# Patient Record
Sex: MALE | Race: WHITE | Employment: PART TIME | URBAN - METROPOLITAN AREA
[De-identification: names, ages, dates, MRNs, and addresses within clinical notes are randomized per-mention and may not be internally consistent; named-entity substitution may affect disease eponyms.]

---

## 2018-08-12 ENCOUNTER — HOSPITAL ENCOUNTER (EMERGENCY)
Facility: CLINIC | Age: 48
Discharge: STILL ADMITTED OR ADMINISTRATIVE | End: 2018-08-12
Payer: COMMERCIAL

## 2018-08-12 ENCOUNTER — HOSPITAL ENCOUNTER (EMERGENCY)
Facility: CLINIC | Age: 48
Discharge: HOME | End: 2018-08-12
Attending: EMERGENCY MEDICINE | Admitting: EMERGENCY MEDICINE
Payer: COMMERCIAL

## 2018-08-12 VITALS
HEIGHT: 70 IN | OXYGEN SATURATION: 96 % | HEART RATE: 123 BPM | RESPIRATION RATE: 18 BRPM | SYSTOLIC BLOOD PRESSURE: 127 MMHG | TEMPERATURE: 98 F | DIASTOLIC BLOOD PRESSURE: 82 MMHG

## 2018-08-12 DIAGNOSIS — R11.2 NAUSEA AND VOMITING, INTRACTABILITY OF VOMITING NOT SPECIFIED, UNSPECIFIED VOMITING TYPE: ICD-10-CM

## 2018-08-12 DIAGNOSIS — R74.8 ELEVATED LIVER ENZYMES: ICD-10-CM

## 2018-08-12 DIAGNOSIS — F10.21 HISTORY OF ALCOHOLISM (H): ICD-10-CM

## 2018-08-12 DIAGNOSIS — N17.9 ACUTE RENAL FAILURE, UNSPECIFIED ACUTE RENAL FAILURE TYPE (H): ICD-10-CM

## 2018-08-12 LAB
ALBUMIN SERPL-MCNC: 3.9 G/DL (ref 3.4–5)
ALP SERPL-CCNC: 87 U/L (ref 40–150)
ALT SERPL W P-5'-P-CCNC: 364 U/L (ref 0–70)
ANION GAP SERPL CALCULATED.3IONS-SCNC: 11 MMOL/L (ref 3–14)
AST SERPL W P-5'-P-CCNC: 291 U/L (ref 0–45)
BASOPHILS # BLD AUTO: 0 10E9/L (ref 0–0.2)
BASOPHILS NFR BLD AUTO: 0.2 %
BILIRUB DIRECT SERPL-MCNC: 0.3 MG/DL (ref 0–0.2)
BILIRUB SERPL-MCNC: 1 MG/DL (ref 0.2–1.3)
BUN SERPL-MCNC: 34 MG/DL (ref 7–30)
CALCIUM SERPL-MCNC: 9.6 MG/DL (ref 8.5–10.1)
CHLORIDE SERPL-SCNC: 91 MMOL/L (ref 94–109)
CO2 SERPL-SCNC: 29 MMOL/L (ref 20–32)
CREAT SERPL-MCNC: 1.52 MG/DL (ref 0.66–1.25)
DIFFERENTIAL METHOD BLD: ABNORMAL
EOSINOPHIL # BLD AUTO: 0 10E9/L (ref 0–0.7)
EOSINOPHIL NFR BLD AUTO: 0.1 %
ERYTHROCYTE [DISTWIDTH] IN BLOOD BY AUTOMATED COUNT: 12.5 % (ref 10–15)
GFR SERPL CREATININE-BSD FRML MDRD: 49 ML/MIN/1.7M2
GLUCOSE SERPL-MCNC: 108 MG/DL (ref 70–99)
HCT VFR BLD AUTO: 47.6 % (ref 40–53)
HGB BLD-MCNC: 16.7 G/DL (ref 13.3–17.7)
IMM GRANULOCYTES # BLD: 0.1 10E9/L (ref 0–0.4)
IMM GRANULOCYTES NFR BLD: 1.3 %
LIPASE SERPL-CCNC: 232 U/L (ref 73–393)
LYMPHOCYTES # BLD AUTO: 1.3 10E9/L (ref 0.8–5.3)
LYMPHOCYTES NFR BLD AUTO: 12.7 %
MAGNESIUM SERPL-MCNC: 1.7 MG/DL (ref 1.6–2.3)
MCH RBC QN AUTO: 34.2 PG (ref 26.5–33)
MCHC RBC AUTO-ENTMCNC: 35.1 G/DL (ref 31.5–36.5)
MCV RBC AUTO: 98 FL (ref 78–100)
MONOCYTES # BLD AUTO: 0.8 10E9/L (ref 0–1.3)
MONOCYTES NFR BLD AUTO: 7.7 %
NEUTROPHILS # BLD AUTO: 7.9 10E9/L (ref 1.6–8.3)
NEUTROPHILS NFR BLD AUTO: 78 %
NRBC # BLD AUTO: 0 10*3/UL
NRBC BLD AUTO-RTO: 0 /100
PLATELET # BLD AUTO: 244 10E9/L (ref 150–450)
POTASSIUM SERPL-SCNC: 3.5 MMOL/L (ref 3.4–5.3)
PROT SERPL-MCNC: 8 G/DL (ref 6.8–8.8)
RBC # BLD AUTO: 4.88 10E12/L (ref 4.4–5.9)
SODIUM SERPL-SCNC: 131 MMOL/L (ref 133–144)
WBC # BLD AUTO: 10.1 10E9/L (ref 4–11)

## 2018-08-12 PROCEDURE — 85025 COMPLETE CBC W/AUTO DIFF WBC: CPT | Performed by: EMERGENCY MEDICINE

## 2018-08-12 PROCEDURE — 80076 HEPATIC FUNCTION PANEL: CPT | Performed by: EMERGENCY MEDICINE

## 2018-08-12 PROCEDURE — 83690 ASSAY OF LIPASE: CPT | Performed by: EMERGENCY MEDICINE

## 2018-08-12 PROCEDURE — 83735 ASSAY OF MAGNESIUM: CPT | Performed by: EMERGENCY MEDICINE

## 2018-08-12 PROCEDURE — 25000128 H RX IP 250 OP 636: Performed by: EMERGENCY MEDICINE

## 2018-08-12 PROCEDURE — 99284 EMERGENCY DEPT VISIT MOD MDM: CPT | Mod: Z6 | Performed by: EMERGENCY MEDICINE

## 2018-08-12 PROCEDURE — 96361 HYDRATE IV INFUSION ADD-ON: CPT

## 2018-08-12 PROCEDURE — 99285 EMERGENCY DEPT VISIT HI MDM: CPT | Mod: 25

## 2018-08-12 PROCEDURE — 25000125 ZZHC RX 250: Performed by: EMERGENCY MEDICINE

## 2018-08-12 PROCEDURE — 80048 BASIC METABOLIC PNL TOTAL CA: CPT | Performed by: EMERGENCY MEDICINE

## 2018-08-12 PROCEDURE — 96376 TX/PRO/DX INJ SAME DRUG ADON: CPT

## 2018-08-12 PROCEDURE — 96365 THER/PROPH/DIAG IV INF INIT: CPT

## 2018-08-12 PROCEDURE — 96366 THER/PROPH/DIAG IV INF ADDON: CPT

## 2018-08-12 PROCEDURE — 96375 TX/PRO/DX INJ NEW DRUG ADDON: CPT

## 2018-08-12 RX ORDER — PROMETHAZINE HYDROCHLORIDE 25 MG/1
12.5 TABLET ORAL EVERY 8 HOURS PRN
Qty: 7 TABLET | Refills: 1 | Status: SHIPPED | OUTPATIENT
Start: 2018-08-12

## 2018-08-12 RX ORDER — ONDANSETRON 4 MG/1
4 TABLET, ORALLY DISINTEGRATING ORAL EVERY 8 HOURS PRN
Qty: 7 TABLET | Refills: 0 | Status: SHIPPED | OUTPATIENT
Start: 2018-08-12

## 2018-08-12 RX ORDER — SODIUM CHLORIDE 9 MG/ML
1000 INJECTION, SOLUTION INTRAVENOUS CONTINUOUS
Status: DISCONTINUED | OUTPATIENT
Start: 2018-08-12 | End: 2018-08-12 | Stop reason: HOSPADM

## 2018-08-12 RX ORDER — ONDANSETRON 2 MG/ML
4 INJECTION INTRAMUSCULAR; INTRAVENOUS EVERY 30 MIN PRN
Status: DISCONTINUED | OUTPATIENT
Start: 2018-08-12 | End: 2018-08-12 | Stop reason: HOSPADM

## 2018-08-12 RX ORDER — BACLOFEN 20 MG/1
20 TABLET ORAL 3 TIMES DAILY
COMMUNITY
Start: 2018-08-06

## 2018-08-12 RX ORDER — LISINOPRIL AND HYDROCHLOROTHIAZIDE 20; 25 MG/1; MG/1
1 TABLET ORAL DAILY
COMMUNITY
Start: 2017-04-11

## 2018-08-12 RX ORDER — PROMETHAZINE HYDROCHLORIDE 25 MG/ML
6.25 INJECTION, SOLUTION INTRAMUSCULAR; INTRAVENOUS
Status: COMPLETED | OUTPATIENT
Start: 2018-08-12 | End: 2018-08-12

## 2018-08-12 RX ADMIN — ONDANSETRON 4 MG: 2 INJECTION INTRAMUSCULAR; INTRAVENOUS at 15:39

## 2018-08-12 RX ADMIN — PROMETHAZINE HYDROCHLORIDE 6.25 MG: 25 INJECTION INTRAMUSCULAR; INTRAVENOUS at 18:23

## 2018-08-12 RX ADMIN — ONDANSETRON 4 MG: 2 INJECTION INTRAMUSCULAR; INTRAVENOUS at 17:21

## 2018-08-12 RX ADMIN — Medication 2 G: at 17:24

## 2018-08-12 RX ADMIN — SODIUM CHLORIDE 1000 ML: 9 INJECTION, SOLUTION INTRAVENOUS at 18:27

## 2018-08-12 RX ADMIN — FOLIC ACID: 5 INJECTION, SOLUTION INTRAMUSCULAR; INTRAVENOUS; SUBCUTANEOUS at 15:55

## 2018-08-12 RX ADMIN — SODIUM CHLORIDE 1000 ML: 9 INJECTION, SOLUTION INTRAVENOUS at 15:36

## 2018-08-12 ASSESSMENT — ENCOUNTER SYMPTOMS
ENDOCRINE NEGATIVE: 1
CONSTITUTIONAL NEGATIVE: 1
VOMITING: 1
EYES NEGATIVE: 1
RESPIRATORY NEGATIVE: 1
CARDIOVASCULAR NEGATIVE: 1
ALLERGIC/IMMUNOLOGIC NEGATIVE: 1
NEUROLOGICAL NEGATIVE: 1
NAUSEA: 1
HEMATOLOGIC/LYMPHATIC NEGATIVE: 1
MUSCULOSKELETAL NEGATIVE: 1
PSYCHIATRIC NEGATIVE: 1

## 2018-08-12 NOTE — ED NOTES
Pt admitted to [facility] last Wednesday for alcoholism.   Pt reports unable to keep anything down since blowing 0.   Pt has received zofran from [facility] with no relief.   Provider in room with pt.

## 2018-08-12 NOTE — ED PROVIDER NOTES
"  History     Chief Complaint   Patient presents with     Nausea & Vomiting     from [place] with n/v.     HPI  Damien Edmondson is a 48 year old male with a history of acute kidney failure, hypertension, alcoholism, obesity and factor V Leiden who presents to the emergency department for evaluation of nausea and vomiting. Patient is currently at [facility] for alcohol detox. His drink of choise is vodka. Patient arrived at [facility] on 08/08/18 and his last drink was earlier that morning. Patient went through the detox program and got down to 0.00 alcohol level. Since then he has not been able to eat or drink without vomiting. Patient complains of nausea and abdominal pain from \"heaving\". Patient denies any diarrhea or bloody stools. Patient is from [place], [place]. 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 Patient is currently taking lisinopril, xarelto, omeprazole and baclofen. He takes baclofen as an \"anti-crave\" medication for his alcoholism. Patient was diagnosed with three blood clots in his left leg over a year ago which is why he is taking xarelto. This is the patients second time going through detox.     Social History: The patient is currently at [facility], but is from [place], [place]. He presents to the ED via [facility] transportation by himself.     Problem List:    There are no active problems to display for this patient.       Past Medical History:    No past medical history on file.    Past Surgical History:    No past surgical history on file.    Family History:    No family history on file.    Social History:  Marital Status:    Social History   Substance Use Topics     Smoking status: Not on file     Smokeless tobacco: Not on file     Alcohol use Not on file        Medications:      baclofen (LIORESAL) 20 MG tablet   lisinopril-hydrochlorothiazide (PRINZIDE/ZESTORETIC) 20-25 MG per tablet   omeprazole (PRILOSEC) 20 MG CR capsule   ondansetron (ZOFRAN ODT) 4 MG ODT tab " "  promethazine (PHENERGAN) 25 MG tablet   rivaroxaban ANTICOAGULANT (XARELTO) 20 MG TABS tablet         Review of Systems   Constitutional: Negative.    HENT: Negative.    Eyes: Negative.    Respiratory: Negative.    Cardiovascular: Negative.    Gastrointestinal: Positive for nausea and vomiting.   Endocrine: Negative.    Genitourinary: Negative.    Musculoskeletal: Negative.    Skin: Negative.    Allergic/Immunologic: Negative.    Neurological: Negative.    Hematological: Negative.    Psychiatric/Behavioral: Negative.    All other systems reviewed and are negative.      Physical Exam   BP: 134/89  Pulse: 123  Heart Rate: 109  Temp: 98.6  F (37  C)  Resp: 18  Height: 177.8 cm (5' 10\")  SpO2: 97 %      Physical Exam   Constitutional: He is oriented to person, place, and time. He appears well-developed and well-nourished. No distress.   HENT:   Head: Normocephalic and atraumatic.   Eyes: Conjunctivae and EOM are normal. Pupils are equal, round, and reactive to light. Right eye exhibits no discharge. Left eye exhibits no discharge. No scleral icterus.   Neck: Normal range of motion. Neck supple. No JVD present. No tracheal deviation present. No thyromegaly present.   Cardiovascular: Normal rate and regular rhythm.  Exam reveals no gallop and no friction rub.    No murmur heard.  Pulmonary/Chest: No stridor.   Lymphadenopathy:     He has no cervical adenopathy.   Neurological: He is alert and oriented to person, place, and time.   Skin: No rash noted. He is not diaphoretic. No erythema. No pallor.   Psychiatric: He has a normal mood and affect. His behavior is normal. Judgment and thought content normal.       ED Course   3:32 PM Patient Assessed.     ED Course     Procedures               Critical Care time:  none               ED Medications:  Medications   0.9% sodium chloride BOLUS (1,000 mLs Intravenous Restarted 8/12/18 1720)     Followed by   sodium chloride 0.9% infusion (1,000 mLs Intravenous New Bag 8/12/18 " 1827)   ondansetron (ZOFRAN) injection 4 mg (4 mg Intravenous Given 8/12/18 1721)   sodium chloride 0.9 % 1,000 mL with INFUVITE ADULT 10 mL, thiamine 100 mg, folic acid 1 mg infusion ( Intravenous Stopped 8/12/18 1720)   magnesium sulfate 2 g in NS intermittent infusion (PharMEDium or FV Cmpd) (0 g Intravenous Stopped 8/12/18 1825)   promethazine (PHENERGAN) IV injection 6.25 mg (6.25 mg Intravenous Given 8/12/18 1823)       ED Vitals:  Vitals:    08/12/18 1815 08/12/18 1830 08/12/18 1900 08/12/18 1930   BP:  141/90 134/82 127/82   Pulse:       Resp: 27 23 20 28   Temp:       TempSrc:       SpO2: 94% 96% 96%    Height:           ED Labs and Imaging:  Results for orders placed or performed during the hospital encounter of 08/12/18 (from the past 24 hour(s))   Basic metabolic panel   Result Value Ref Range    Sodium 131 (L) 133 - 144 mmol/L    Potassium 3.5 3.4 - 5.3 mmol/L    Chloride 91 (L) 94 - 109 mmol/L    Carbon Dioxide 29 20 - 32 mmol/L    Anion Gap 11 3 - 14 mmol/L    Glucose 108 (H) 70 - 99 mg/dL    Urea Nitrogen 34 (H) 7 - 30 mg/dL    Creatinine 1.52 (H) 0.66 - 1.25 mg/dL    GFR Estimate 49 (L) >60 mL/min/1.7m2    GFR Estimate If Black 60 (L) >60 mL/min/1.7m2    Calcium 9.6 8.5 - 10.1 mg/dL   CBC with platelets differential   Result Value Ref Range    WBC 10.1 4.0 - 11.0 10e9/L    RBC Count 4.88 4.4 - 5.9 10e12/L    Hemoglobin 16.7 13.3 - 17.7 g/dL    Hematocrit 47.6 40.0 - 53.0 %    MCV 98 78 - 100 fl    MCH 34.2 (H) 26.5 - 33.0 pg    MCHC 35.1 31.5 - 36.5 g/dL    RDW 12.5 10.0 - 15.0 %    Platelet Count 244 150 - 450 10e9/L    Diff Method Automated Method     % Neutrophils 78.0 %    % Lymphocytes 12.7 %    % Monocytes 7.7 %    % Eosinophils 0.1 %    % Basophils 0.2 %    % Immature Granulocytes 1.3 %    Nucleated RBCs 0 0 /100    Absolute Neutrophil 7.9 1.6 - 8.3 10e9/L    Absolute Lymphocytes 1.3 0.8 - 5.3 10e9/L    Absolute Monocytes 0.8 0.0 - 1.3 10e9/L    Absolute Eosinophils 0.0 0.0 - 0.7 10e9/L     "Absolute Basophils 0.0 0.0 - 0.2 10e9/L    Abs Immature Granulocytes 0.1 0 - 0.4 10e9/L    Absolute Nucleated RBC 0.0    Lipase   Result Value Ref Range    Lipase 232 73 - 393 U/L   Magnesium   Result Value Ref Range    Magnesium 1.7 1.6 - 2.3 mg/dL   Hepatic panel   Result Value Ref Range    Bilirubin Direct 0.3 (H) 0.0 - 0.2 mg/dL    Bilirubin Total 1.0 0.2 - 1.3 mg/dL    Albumin 3.9 3.4 - 5.0 g/dL    Protein Total 8.0 6.8 - 8.8 g/dL    Alkaline Phosphatase 87 40 - 150 U/L     (H) 0 - 70 U/L     (H) 0 - 45 U/L         Assessments & Plan (with Medical Decision Making)   Clinical Impression: 48-year-old male with a history of hypertension, generalized anxiety disorder, history of alcohol abuse with prior history of Wernicke's Korsakoff syndrome, acute idiopathic gout who presented for evaluation for nausea and vomiting and concern for dehydration versus alcoholic ketoacidosis.  Patient is currently at [facility].  He arrived at [facility] 4 days prior.  He is in [facility] for treatment for alcoholism.  He reports his drink of choice is vodka.  Last drink was on August 8, 2018.  This is a second time in treatment by his report.  He is originally from [facility].  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  He reports he is on lisinopril for hypertension takes baclofen for chronic pain and to help with \"cravings\".  He also takes omeprazole and has been on Xarelto since 2017 after he was diagnosed with a left lower leg DVT.  He reports a history of factor V Leiden.  On my exam patient has stigmata of alcoholism with generalized facial flushing.  He is tachycardic at rest his blood pressure is normal he is afebrile 97% on room air.      ED Course and  Plan:   I reviewed his medical records.  Patient was last seen in the emergency department in January 2018 for episode of nausea and vomiting related to recent diagnosis of pneumonia treated with doxycycline.  He was given IV fluids and " antibiotic therapy.  Also given a banana bag.  Reviewed patient's medical records from [facility].  Records indicate the patient had elevated liver enzymes with an AST of 227, ALT of 260.  GGT is 691.  Total bilirubin was 1.6.  Creatinine was 1.35 normal potassium magnesium was 1.6    His arrival creatinine today is 1.5, increased from intake labs reported from [place].  Normal lipase today.  Patient does have a known history of kidney injury/disease.  His creatinine in January 2018 was 1.1.  Patient was reevaluated after IV fluids, IV banana bag, 2 doses of IV Zofran and IV Phenergan.  He reported feeling some improvement in his symptoms.  Did not report any abdominal pain hence there was no indication for abdominal imaging including CT or ultrasound.  I did stress to the patient that it would be helpful to have a repeat creatinine after IV fluids in the next 2-3 days.  Patient is discharged home with Zofran ODT for as needed use to help with nausea and vomiting.  Patient did not report any diarrhea and did not have any fever.  He had some resting tachycardia but no tremor prior to discharge    With his elevated liver enzymes are considered portal vein thrombosis with underlying history of alcoholism.  There is no known history of liver disease, or portal hypertension.  Patient did not complain of any abdominal pain was hemodynamically stable.  I did recommend on discharge paperwork that he is loading consider an outpatient ultrasound if he continues to have rising liver enzymes.  I suggested a recheck of his creatinine within 72 hours to ensure that it was normalizing or at least improving towards baseline after receiving IV fluids and treatment in the emergency department.      Disclaimer: This note consists of symbols derived from keyboarding, dictation and/or voice recognition software. As a result, there may be errors in the script that have gone undetected. Please consider this when interpreting information  found in this chart.      I have reviewed the nursing notes.    I have reviewed the findings, diagnosis, plan and need for follow up with the patient.       New Prescriptions    ONDANSETRON (ZOFRAN ODT) 4 MG ODT TAB    Take 1 tablet (4 mg) by mouth every 8 hours as needed    PROMETHAZINE (PHENERGAN) 25 MG TABLET    Take 0.5 tablets (12.5 mg) by mouth every 8 hours as needed for nausea       Final diagnoses:   Acute renal failure, unspecified acute renal failure type (H) - Last creatinine on file is from January 2018 when it was 1.1.  Was 1.35 at [facility]   History of alcoholism (H)   Nausea and vomiting, intractability of vomiting not specified, unspecified vomiting type   Elevated liver enzymes - ALT is 364, AST is 291.  While you are at [facility] your ALT was 260, AST was 227   This document serves as a record of the services and decisions personally performed and made by Man Paulson, *. It was created on HIS/HER behalf by Heidi Shepherd, a trained medical scribe. The creation of this document is based on the provider's statements to the medical scribe.  Heidi Shepherd 3:27 PM 8/12/2018    Provider:  The information in this document, created by the medical scribe for me, accurately reflects the services I personally performed and the decisions made by me. I have reviewed and approved this document for accuracy prior to leaving the patient care area.  Man Paulson, * 3:27 PM 8/12/2018 8/12/2018   [facility] EMERGENCY DEPARTMENT     Man Paulson MD  08/12/18 3752

## 2018-08-12 NOTE — ED AVS SNAPSHOT
[facility] Emergency Department    [address] [address]    Phone:  [phone]    Fax:  [phone]                                       Damien Edmondson   MRN: [id number]    Department:  [facility] Emergency Department   Date of Visit:  8/12/2018           After Visit Summary Signature Page     I have received my discharge instructions, and my questions have been answered. I have discussed any challenges I see with this plan with the nurse or doctor.    ..........................................................................................................................................  Patient/Patient Representative Signature      ..........................................................................................................................................  Patient Representative Print Name and Relationship to Patient    ..................................................               ................................................  Date                                            Time    ..........................................................................................................................................  Reviewed by Signature/Title    ...................................................              ..............................................  Date                                                            Time

## 2018-08-12 NOTE — ED AVS SNAPSHOT
[facility] Emergency Department    [address] [place] [address]    Phone:  [phone]    Fax:  [phone]                                       Damien Edmondson   MRN: [id number]    Department:  [facility] Emergency Department   Date of Visit:  8/12/2018           Patient Information     Date Of Birth          1970        Your diagnoses for this visit were:     Acute renal failure, unspecified acute renal failure type (H) Last creatinine on file is from January 2018 when it was 1.1.  Was 1.35 at [facility]    History of alcoholism (H)     Nausea and vomiting, intractability of vomiting not specified, unspecified vomiting type     Elevated liver enzymes ALT is 364, AST is 291.  While you are at [facility] your ALT was 260, AST was 227       You were seen by Man Paulson MD.      Follow-up Information     Follow up with [facility] Emergency Department.    Specialty:  EMERGENCY MEDICINE    Why:  I recommend a repeat recheck of your creatinine. it is elevated and concerning for kidney injury. because you are vomiting, and have had decreased oral intake your should have your creatinine rechecked within the next 3 days.    Contact information:    [address] [place] [address] [phone]    Additional information:    The medical center is located at   [address] [place] [address] (between [facility] and   [address], four miles north   of [place]).        Follow up with [facility] Emergency Department.    Specialty:  EMERGENCY MEDICINE    Why:  Take Zofran ODT prescribed as needed for nausea (1-2 tabs every 4-6 hours). You may try phenergran (6.25mg to 12.5mg) every 6-8 hours if no relief or improvement with zofran     Contact information:    [address] [place] [address] [phone]    Additional information:    The medical center is located at   [address] [place] [address] (between [facility] and   [address], four miles north   of [place]  [place]).        Follow up with [facility] Emergency Department.    Specialty:  EMERGENCY MEDICINE    Why:  Your elevated liver enzymes may be related to no alcohol dependence.  A follow-up recheck including consideration of an ultrasound if you develop abdominal pain may be helpful    Contact information:    [address] [facility] [address] [phone]    Additional information:    The medical center is located at   [address]. (between [address]   [address] in [place], four miles north   of [place]).      24 Hour Appointment Hotline       To make an appointment at any [facility] clinic, call [phone] ([phone]). If you don't have a family doctor or clinic, we will help you find one. [facility] clinics are conveniently located to serve the needs of you and your family.             Review of your medicines      START taking        Dose / Directions Last dose taken    ondansetron 4 MG ODT tab   Commonly known as:  ZOFRAN ODT   Dose:  4 mg   Quantity:  7 tablet        Take 1 tablet (4 mg) by mouth every 8 hours as needed   Refills:  0        promethazine 25 MG tablet   Commonly known as:  PHENERGAN   Dose:  12.5 mg   Quantity:  7 tablet        Take 0.5 tablets (12.5 mg) by mouth every 8 hours as needed for nausea   Refills:  1          Our records show that you are taking the medicines listed below. If these are incorrect, please call your family doctor or clinic.        Dose / Directions Last dose taken    baclofen 20 MG tablet   Commonly known as:  LIORESAL   Dose:  20 mg        Take 20 mg by mouth 3 times daily   Refills:  0        lisinopril-hydrochlorothiazide 20-25 MG per tablet   Commonly known as:  PRINZIDE/ZESTORETIC   Dose:  1 tablet        Take 1 tablet by mouth daily   Refills:  0        omeprazole 20 MG CR capsule   Commonly known as:  priLOSEC   Dose:  20 mg        Take 20 mg by mouth daily   Refills:  0        rivaroxaban ANTICOAGULANT 20 MG Tabs tablet   Commonly  known as:  XARELTO   Dose:  20 mg        Take 20 mg by mouth daily   Refills:  0                Prescriptions were sent or printed at these locations (2 Prescriptions)                   [facility] [place], [place] - [address]   [address] [facility] [address] [address]    Telephone:  [phone]   Fax:  [phone]   Hours:                  Printed at Department/Unit printer (2 of 2)         ondansetron (ZOFRAN ODT) 4 MG ODT tab               promethazine (PHENERGAN) 25 MG tablet                Procedures and tests performed during your visit     Basic metabolic panel    CBC with platelets differential    Hepatic panel    Lipase    Magnesium      Orders Needing Specimen Collection     None      Pending Results     No orders found from 8/10/2018 to 8/13/2018.            Pending Culture Results     No orders found from 8/10/2018 to 8/13/2018.            Pending Results Instructions     If you had any lab results that were not finalized at the time of your Discharge, you can call the ED Lab Result RN at [phone]. You will be contacted by this team for any positive Lab results or changes in treatment. The nurses are available 7 days a week from 10A to 6:30P.  You can leave a message 24 hours per day and they will return your call.        Test Results From Your Hospital Stay        8/12/2018  4:01 PM      Component Results     Component Value Ref Range & Units Status    Sodium 131 (L) 133 - 144 mmol/L Final    Potassium 3.5 3.4 - 5.3 mmol/L Final    Chloride 91 (L) 94 - 109 mmol/L Final    Carbon Dioxide 29 20 - 32 mmol/L Final    Anion Gap 11 3 - 14 mmol/L Final    Glucose 108 (H) 70 - 99 mg/dL Final    Urea Nitrogen 34 (H) 7 - 30 mg/dL Final    Creatinine 1.52 (H) 0.66 - 1.25 mg/dL Final    GFR Estimate 49 (L) >60 mL/min/1.7m2 Final    Non  GFR Calc    GFR Estimate If Black 60 (L) >60 mL/min/1.7m2 Final    African American GFR Calc    Calcium 9.6 8.5 - 10.1 mg/dL Final          8/12/2018  4:01 PM      Component Results     Component Value Ref Range & Units Status    WBC 10.1 4.0 - 11.0 10e9/L Final    RBC Count 4.88 4.4 - 5.9 10e12/L Final    Hemoglobin 16.7 13.3 - 17.7 g/dL Final    Hematocrit 47.6 40.0 - 53.0 % Final    MCV 98 78 - 100 fl Final    MCH 34.2 (H) 26.5 - 33.0 pg Final    MCHC 35.1 31.5 - 36.5 g/dL Final    RDW 12.5 10.0 - 15.0 % Final    Platelet Count 244 150 - 450 10e9/L Final    Diff Method Automated Method  Final    % Neutrophils 78.0 % Final    % Lymphocytes 12.7 % Final    % Monocytes 7.7 % Final    % Eosinophils 0.1 % Final    % Basophils 0.2 % Final    % Immature Granulocytes 1.3 % Final    Nucleated RBCs 0 0 /100 Final    Absolute Neutrophil 7.9 1.6 - 8.3 10e9/L Final    Absolute Lymphocytes 1.3 0.8 - 5.3 10e9/L Final    Absolute Monocytes 0.8 0.0 - 1.3 10e9/L Final    Absolute Eosinophils 0.0 0.0 - 0.7 10e9/L Final    Absolute Basophils 0.0 0.0 - 0.2 10e9/L Final    Abs Immature Granulocytes 0.1 0 - 0.4 10e9/L Final    Absolute Nucleated RBC 0.0  Final         8/12/2018  4:01 PM      Component Results     Component Value Ref Range & Units Status    Lipase 232 73 - 393 U/L Final         8/12/2018  5:06 PM      Component Results     Component Value Ref Range & Units Status    Magnesium 1.7 1.6 - 2.3 mg/dL Final         8/12/2018  7:56 PM      Component Results     Component Value Ref Range & Units Status    Bilirubin Direct 0.3 (H) 0.0 - 0.2 mg/dL Final    Bilirubin Total 1.0 0.2 - 1.3 mg/dL Final    Albumin 3.9 3.4 - 5.0 g/dL Final    Protein Total 8.0 6.8 - 8.8 g/dL Final    Alkaline Phosphatase 87 40 - 150 U/L Final     (H) 0 - 70 U/L Final     (H) 0 - 45 U/L Final                Thank you for choosing [facility]       Thank you for choosing [facility] for your care. Our goal is always to provide you with excellent care. Hearing back from our patients is one way we can continue to improve our services. Please take a few minutes to complete the written  "survey that you may receive in the mail after you visit with us. Thank you!        [employer] Information     [employer] lets you send messages to your doctor, view your test results, renew your prescriptions, schedule appointments and more. To sign up, go to [email or web address] . Click on \"Log in\" on the left side of the screen, which will take you to the Welcome page. Then click on \"Sign up Now\" on the right side of the page.     You will be asked to enter the access code listed below, as well as some personal information. Please follow the directions to create your username and password.     Your access code is: [email or web address]  Expires: 11/10/2018  8:16 PM     Your access code will  in 90 days. If you need help or a new code, please call your [facility] clinic or [phone].        Care EveryWhere ID     This is your Care EveryWhere ID. This could be used by other organizations to access your [facility] medical records  [id number]        Equal Access to Services     TAMI [facility] : Hadcami scotto Soflores, waaxda luqadaha, qaybta kaalmada angel, july pelayo . So [facility] [phone].    ATENCIÓN: Si habla español, tiene a mao disposición servicios gratuitos de asistencia lingüística. Llame al [phone].    We comply with applicable federal civil rights laws and [place] laws. We do not discriminate on the basis of race, color, national origin, age, disability, sex, sexual orientation, or gender identity.            After Visit Summary       This is your record. Keep this with you and show to your community pharmacist(s) and doctor(s) at your next visit.                  "

## 2018-08-13 NOTE — ED NOTES
Report given to Sedrick PETER from [facility].   Provider will call our provider for acceptance back to facility.

## 2018-08-26 ENCOUNTER — HOSPITAL ENCOUNTER (EMERGENCY)
Facility: CLINIC | Age: 48
Discharge: HOME | End: 2018-08-26
Attending: PHYSICIAN ASSISTANT | Admitting: PHYSICIAN ASSISTANT
Payer: COMMERCIAL

## 2018-08-26 VITALS
SYSTOLIC BLOOD PRESSURE: 123 MMHG | OXYGEN SATURATION: 97 % | TEMPERATURE: 99.2 F | DIASTOLIC BLOOD PRESSURE: 80 MMHG | HEART RATE: 95 BPM | HEIGHT: 70 IN | RESPIRATION RATE: 16 BRPM

## 2018-08-26 DIAGNOSIS — M10.071 ACUTE IDIOPATHIC GOUT OF RIGHT FOOT: ICD-10-CM

## 2018-08-26 PROCEDURE — G0463 HOSPITAL OUTPT CLINIC VISIT: HCPCS

## 2018-08-26 PROCEDURE — 99213 OFFICE O/P EST LOW 20 MIN: CPT | Performed by: PHYSICIAN ASSISTANT

## 2018-08-26 RX ORDER — PREDNISONE 10 MG/1
TABLET ORAL
Qty: 37 TABLET | Refills: 0 | Status: SHIPPED | OUTPATIENT
Start: 2018-08-26 | End: 2018-09-05

## 2018-08-26 ASSESSMENT — ENCOUNTER SYMPTOMS
WEAKNESS: 0
NUMBNESS: 0
ARTHRALGIAS: 1
FEVER: 0
WOUND: 0

## 2018-08-26 NOTE — DISCHARGE INSTRUCTIONS
Ice, elevate, rest.     Patient to start prednisone course prescribed tomorrow.   Patient given topical diclofenac gel to use for pain.   Patient given orthopedic information for follow up and further treatment.     Patient to return if increased redness, swelling, warmth, red streaking up leg, fevers, decreased range of motion occur.   Patient offered crutches and repeat labs (CBC, CMP) today in office but patient declined at this time.

## 2018-08-26 NOTE — ED AVS SNAPSHOT
[facility] Emergency Department    [address] [address]    Phone:  [phone]    Fax:  [phone]                                       Damien Edmondson   MRN: [id number]    Department:  [facility] Emergency Department   Date of Visit:  8/26/2018           Patient Information     Date Of Birth          1970        Your diagnoses for this visit were:     Acute idiopathic gout of right foot        You were seen by Shima Green PA-C.      Follow-up Information     Follow up with System, Provider Not In.    Specialty:  Clinic    Contact information:               Follow up with [facility] Emergency Department.    Specialty:  EMERGENCY MEDICINE    Why:  As needed, If symptoms worsen    Contact information:    [facility] [facility] [address] [phone]    Additional information:    The medical center is located at   [address] (between [address] and   [address] in [place], [place] miles north   of [place]).        Discharge Instructions       Ice, elevate, rest.     Patient to start prednisone course prescribed tomorrow.   Patient given topical diclofenac gel to use for pain.   Patient given orthopedic information for follow up and further treatment.     Patient to return if increased redness, swelling, warmth, red streaking up leg, fevers, decreased range of motion occur.   Patient offered crutches and repeat labs (CBC, CMP) today in office but patient declined at this time.         24 Hour Appointment Hotline       To make an appointment at any [place] clinic, call [phone] ([phone]). If you don't have a family doctor or clinic, we will help you find one. [place] clinics are conveniently located to serve the needs of you and your family.          ED Discharge Orders     ORTHO  REFERRAL       [facility] is referring you to the Orthopedic  Services at [facility].       The   Representative will assist you in the coordination of your Orthopedic and Musculoskeletal Care as prescribed by your physician.    The  Representative will call you within 1 business day to help schedule your appointment, or you may contact the  Representative at:    All areas ~ [phone]     Type of Referral : Non Surgical       Timeframe requested: 1 - 2 days    Coverage of these services is subject to the terms and limitations of your health insurance plan.  Please call member services at your health plan with any benefit or coverage questions.      If X-rays, CT or MRI's have been performed, please contact the facility where they were done to arrange for , prior to your scheduled appointment.  Please bring this referral request to your appointment and present it to your specialist.                     Review of your medicines      START taking        Dose / Directions Last dose taken    diclofenac 1 % Gel topical gel   Commonly known as:  VOLTAREN   Quantity:  100 g        Apply small amount topically to the right toe/MCP joint every 6 hours (no more than 3 times daily) as needed. Do not use more than 5 days.   Refills:  0        predniSONE 10 MG tablet   Commonly known as:  DELTASONE   Quantity:  37 tablet        Take 4 tablets daily for 7 days,  take 2 tablets daily for 3 days, take 1 tablet daily for 2 days, take half a tablet for 2 days.   Refills:  0          Our records show that you are taking the medicines listed below. If these are incorrect, please call your family doctor or clinic.        Dose / Directions Last dose taken    baclofen 20 MG tablet   Commonly known as:  LIORESAL   Dose:  20 mg        Take 20 mg by mouth 3 times daily   Refills:  0        lisinopril-hydrochlorothiazide 20-25 MG per tablet   Commonly known as:  PRINZIDE/ZESTORETIC   Dose:  1 tablet        Take 1 tablet by mouth daily   Refills:  0        omeprazole 20 MG CR capsule   Commonly known  as:  priLOSEC   Dose:  20 mg        Take 20 mg by mouth daily   Refills:  0        ondansetron 4 MG ODT tab   Commonly known as:  ZOFRAN ODT   Dose:  4 mg   Quantity:  7 tablet        Take 1 tablet (4 mg) by mouth every 8 hours as needed   Refills:  0        promethazine 25 MG tablet   Commonly known as:  PHENERGAN   Dose:  12.5 mg   Quantity:  7 tablet        Take 0.5 tablets (12.5 mg) by mouth every 8 hours as needed for nausea   Refills:  1        rivaroxaban ANTICOAGULANT 20 MG Tabs tablet   Commonly known as:  XARELTO   Dose:  20 mg        Take 20 mg by mouth daily   Refills:  0                Prescriptions were sent or printed at these locations (2 Prescriptions)                   [facility] [place], [place] - [address]   [address] [address]    Telephone:  [phone]   Fax:  [phone]   Hours:                  E-Prescribed (2 of 2)         diclofenac (VOLTAREN) 1 % GEL topical gel               predniSONE (DELTASONE) 10 MG tablet                Orders Needing Specimen Collection     None      Pending Results     No orders found from 8/24/2018 to 8/27/2018.            Pending Culture Results     No orders found from 8/24/2018 to 8/27/2018.            Pending Results Instructions     If you had any lab results that were not finalized at the time of your Discharge, you can call the ED Lab Result RN at [phone]. You will be contacted by this team for any positive Lab results or changes in treatment. The nurses are available 7 days a week from 10A to 6:30P.  You can leave a message 24 hours per day and they will return your call.        Test Results From Your Hospital Stay               Thank you for choosing [facility]       Thank you for choosing [facility] for your care. Our goal is always to provide you with excellent care. Hearing back from our patients is one way we can continue to improve our services. Please take a few minutes to complete the written survey that  "you may receive in the mail after you visit with us. Thank you!        [employer] Information     [facility] lets you send messages to your doctor, view your test results, renew your prescriptions, schedule appointments and more. To sign up, go to [email or web address] . Click on \"Log in\" on the left side of the screen, which will take you to the Welcome page. Then click on \"Sign up Now\" on the right side of the page.     You will be asked to enter the access code listed below, as well as some personal information. Please follow the directions to create your username and password.     Your access code is: [email or web address]  Expires: 11/10/2018  8:16 PM     Your access code will  in 90 days. If you need help or a new code, please call your [facility] clinic or [phone].        Care EveryWhere ID     This is your Care EveryWhere ID. This could be used by other organizations to access your [facility] medical records  [id number]        Equal Access to Services     [facility] Hospital[facility]PEZ : Hadii baljeet scotto Soflores, waaxda luqadaha, qaybta kaalmada adeabdelrahman, july pelayo . So [facility] [phone].    ATENCIÓN: Si habla español, tiene a mao disposición servicios gratuitos de asistencia lingüística. Llame al [phone].    We comply with applicable federal civil rights laws and [place] laws. We do not discriminate on the basis of race, color, national origin, age, disability, sex, sexual orientation, or gender identity.            After Visit Summary       This is your record. Keep this with you and show to your community pharmacist(s) and doctor(s) at your next visit.                  "

## 2018-08-26 NOTE — ED PROVIDER NOTES
History     Chief Complaint   Patient presents with     gout     HPI  Damien Edmondson is a 48 year old male who presents to the urgent care with right great toe pain that started about 1 week ago. Patient states he has a history of gout and it is usually in that toe. Patient states he is currently in [facility] for alcoholism and was seen on 8/12/18 in Emergency Room with findings of elevated liver functions and renal impairment issues. Patient states was started on a prednisone taper course for gout 3-4 days ago with improvement of symptoms the first two days, but since tapering dose of prednisone the pain has increased again. Patient states he was given tylenol over the counter for the pain also. Patient denies fevers, increased redness, increased swelling, no red streaking up foot, and no decreased range of motion in the foot. Patient states he has noticed some increased pain starting to radiated about 1 inch up the foot. Patient states he has had to have steroid injections into the joint in the past for flare ups.     Problem List:    There are no active problems to display for this patient.       Past Medical History:    No past medical history on file.    Past Surgical History:    No past surgical history on file.    Family History:    No family history on file.    Social History:  Marital Status:   [2]  Social History   Substance Use Topics     Smoking status: Not on file     Smokeless tobacco: Not on file     Alcohol use Not on file        Medications:      diclofenac (VOLTAREN) 1 % GEL topical gel   predniSONE (DELTASONE) 10 MG tablet   baclofen (LIORESAL) 20 MG tablet   lisinopril-hydrochlorothiazide (PRINZIDE/ZESTORETIC) 20-25 MG per tablet   omeprazole (PRILOSEC) 20 MG CR capsule   ondansetron (ZOFRAN ODT) 4 MG ODT tab   promethazine (PHENERGAN) 25 MG tablet   rivaroxaban ANTICOAGULANT (XARELTO) 20 MG TABS tablet         Review of Systems   Constitutional: Negative for fever.  "  Musculoskeletal: Positive for arthralgias (right MCP joint pain. ).   Skin: Negative for rash and wound.        Positive redness, pain, swelling to the MCP joint of the right foot.    Neurological: Negative for weakness and numbness.   All other systems reviewed and are negative.      Physical Exam   BP: 123/80  Pulse: 95  Temp: 99.2  F (37.3  C)  Resp: 16  Height: 177.8 cm (5' 10\")  SpO2: 97 %      Physical Exam   Constitutional: He is oriented to person, place, and time. He appears well-developed and well-nourished. No distress.   Cardiovascular: Normal rate, regular rhythm, normal heart sounds and intact distal pulses.    Pulmonary/Chest: Effort normal and breath sounds normal.   Musculoskeletal:        Right ankle: Normal. Achilles tendon normal.        Right foot: There is tenderness, bony tenderness and swelling. There is normal range of motion, normal capillary refill, no crepitus, no deformity and no laceration.        Feet:    Neurological: He is alert and oriented to person, place, and time. He has normal strength. No sensory deficit. GCS eye subscore is 4. GCS verbal subscore is 5. GCS motor subscore is 6.   Gait limited due to pain. Patient walking with cane.    Skin: Skin is warm and dry. No bruising, no ecchymosis and no rash noted. He is not diaphoretic. There is erythema. No pallor.   Psychiatric: He has a normal mood and affect. His behavior is normal. Judgment and thought content normal.       ED Course     ED Course     Procedures              Critical Care time:  none               No results found for this or any previous visit (from the past 24 hour(s)).    Medications - No data to display    Assessments & Plan (with Medical Decision Making)     I have reviewed the nursing notes.    I have reviewed the findings, diagnosis, plan and need for follow up with the patient.   acute idiopathic gout of right foot: will increase and lengthen prednisone taper and add topical voltaren gel (this was " discussed with pharmacist and Dr. Owen who agreed with no concerns for medication interaction with blood thinner and with renal function). Patient to see orthopedic specialist for further evaluation and possible injection of joint. Patient to return to Emergency Room if fevers, red streaking, increased redness occur. Patient declined CBC, CMP in office today; although suspicion is low for cellulitis due to not being able to obtain labs I can not rule this out 100%, but over the past week redness has not increased and no fever also no decreased range of motion in joint, but cellulitis and septic joint are low suspicion. Patient declined crutches in office also today states he has a cane.     Discharge Medication List as of 8/26/2018  5:31 PM      START taking these medications    Details   diclofenac (VOLTAREN) 1 % GEL topical gel Apply small amount topically to the right toe/MCP joint every 6 hours (no more than 3 times daily) as needed. Do not use more than 5 days.Disp-100 g, [id number]      predniSONE (DELTASONE) 10 MG tablet Take 4 tablets daily for 7 days,  take 2 tablets daily for 3 days, take 1 tablet daily for 2 days, take half a tablet for 2 days., Disp-37 tablet, R-0, E-Prescribe             Final diagnoses:   Acute idiopathic gout of right foot       8/26/2018   [facility] EMERGENCY DEPARTMENT     Shima Green PA-C  08/26/18 [id number]

## 2018-08-26 NOTE — ED NOTES
Bed: IN01  Expected date:   Expected time:   Means of arrival:   Comments:  Damien Edmondson 49 yo, 2 weeks at [facility], gout exacerbation, initially elevated LFT so colchicine contraindicated.

## 2018-08-26 NOTE — ED AVS SNAPSHOT
[facility] Emergency Department    [address] [address]    Phone:  [phone]    Fax:  [phone]                                       Damien Edmondson   MRN: [id number]    Department:  [facility] Emergency Department   Date of Visit:  8/26/2018           After Visit Summary Signature Page     I have received my discharge instructions, and my questions have been answered. I have discussed any challenges I see with this plan with the nurse or doctor.    ..........................................................................................................................................  Patient/Patient Representative Signature      ..........................................................................................................................................  Patient Representative Print Name and Relationship to Patient    ..................................................               ................................................  Date                                            Time    ..........................................................................................................................................  Reviewed by Signature/Title    ...................................................              ..............................................  Date                                                            Time          22EPIC Rev 08/18